# Patient Record
Sex: FEMALE | Race: WHITE | NOT HISPANIC OR LATINO | Employment: UNEMPLOYED | URBAN - METROPOLITAN AREA
[De-identification: names, ages, dates, MRNs, and addresses within clinical notes are randomized per-mention and may not be internally consistent; named-entity substitution may affect disease eponyms.]

---
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
## 2025-06-17 ENCOUNTER — TELEPHONE (OUTPATIENT)
Age: 20
End: 2025-06-17

## 2025-06-23 ENCOUNTER — TELEPHONE (OUTPATIENT)
Dept: BEHAVIORAL/MENTAL HEALTH CLINIC | Facility: CLINIC | Age: 20
End: 2025-06-23

## 2025-06-23 NOTE — TELEPHONE ENCOUNTER
Forms sent via [employer]. Pending [employer].    [facility] requesting that the forms be completed via [employer] if activated. Otherwise arrive at least 15 minutes early for appt to sign forms.

## 2025-07-15 ENCOUNTER — OFFICE VISIT (OUTPATIENT)
Dept: FAMILY MEDICINE CLINIC | Facility: CLINIC | Age: 20
End: 2025-07-15

## 2025-07-15 ENCOUNTER — OFFICE VISIT (OUTPATIENT)
Dept: PSYCHIATRY | Facility: CLINIC | Age: 20
End: 2025-07-15
Payer: COMMERCIAL

## 2025-07-15 VITALS
BODY MASS INDEX: 35.97 KG/M2 | DIASTOLIC BLOOD PRESSURE: 78 MMHG | OXYGEN SATURATION: 98 % | SYSTOLIC BLOOD PRESSURE: 118 MMHG | HEART RATE: 77 BPM | HEIGHT: 63 IN | WEIGHT: 203 LBS

## 2025-07-15 DIAGNOSIS — Z00.00 ANNUAL PHYSICAL EXAM: Primary | ICD-10-CM

## 2025-07-15 DIAGNOSIS — E55.9 VITAMIN D DEFICIENCY: ICD-10-CM

## 2025-07-15 DIAGNOSIS — Z13.1 SCREENING FOR DIABETES MELLITUS: ICD-10-CM

## 2025-07-15 DIAGNOSIS — N92.6 ABNORMAL MENSTRUAL CYCLE: ICD-10-CM

## 2025-07-15 DIAGNOSIS — Z13.0 SCREENING FOR DEFICIENCY ANEMIA: ICD-10-CM

## 2025-07-15 DIAGNOSIS — F41.8 DEPRESSION WITH ANXIETY: ICD-10-CM

## 2025-07-15 DIAGNOSIS — Z13.6 SCREENING FOR CARDIOVASCULAR CONDITION: ICD-10-CM

## 2025-07-15 DIAGNOSIS — Z11.59 NEED FOR HEPATITIS C SCREENING TEST: ICD-10-CM

## 2025-07-15 DIAGNOSIS — Z11.4 SCREENING FOR HIV (HUMAN IMMUNODEFICIENCY VIRUS): ICD-10-CM

## 2025-07-15 DIAGNOSIS — F41.1 GAD (GENERALIZED ANXIETY DISORDER): Primary | ICD-10-CM

## 2025-07-15 DIAGNOSIS — Z12.4 SCREENING FOR CERVICAL CANCER: ICD-10-CM

## 2025-07-15 DIAGNOSIS — Z13.29 SCREENING FOR THYROID DISORDER: ICD-10-CM

## 2025-07-15 PROBLEM — Z00.129 ENCOUNTER FOR WELL CHILD VISIT AT 17 YEARS OF AGE: Status: RESOLVED | Noted: 2022-11-09 | Resolved: 2025-07-15

## 2025-07-15 PROBLEM — Z71.82 EXERCISE COUNSELING: Status: RESOLVED | Noted: 2022-11-09 | Resolved: 2025-07-15

## 2025-07-15 PROBLEM — Z71.3 NUTRITIONAL COUNSELING: Status: RESOLVED | Noted: 2022-11-09 | Resolved: 2025-07-15

## 2025-07-15 PROCEDURE — 90792 PSYCH DIAG EVAL W/MED SRVCS: CPT

## 2025-07-15 RX ORDER — ESCITALOPRAM OXALATE 5 MG/1
5 TABLET ORAL DAILY
Qty: 30 TABLET | Refills: 1 | Status: SHIPPED | OUTPATIENT
Start: 2025-07-15

## 2025-07-15 NOTE — PROGRESS NOTES
Adult Annual Physical  Name: Kay Solorzano      : 2005      MRN: [id number]  Encounter Provider: JC Emanuel  Encounter Date: 7/15/2025   Encounter department: [facility]    :  Assessment & Plan  Annual physical exam  Routine labs ordered - will be completing following today's visit. Immunizations UTD. Referral to GYN placed to establish. F/u in 1 year or sooner as needed pending lab abnormalities.        Abnormal menstrual cycle  Reports periods are regular but occasionally heavier - feels it is associated with her mood and stress during the school year. LMP 06/15/2025. Should be getting menses any day now. Not currently sexually active, not on hormonal control.        Depression with anxiety  PHQ-9: 5 - will be establishing with psychologist today following this visit. Denies interest in medication management at this time. Denies HI/SI.          Vitamin D deficiency  Noted deficiency in 2018 - not currently taking supplements. Will repeat screening.   Orders:    Vitamin D 25 hydroxy; Future    Screening for cardiovascular condition  No prior known history, will screen to rule out.   Orders:    Lipid Panel with Direct LDL reflex; Future    Screening for thyroid disorder  No prior known history, will screen to rule out.   Orders:    TSH, 3rd generation with Free T4 reflex; Future    Screening for HIV (human immunodeficiency virus)  One-time preventative screening ordered.   Orders:    HIV 1/2 AG/AB w Reflex [facility] for 2 yr old and above; Future    Need for hepatitis C screening test  One-time preventative screening ordered.  Orders:    Hepatitis C Antibody; Future    Screening for deficiency anemia  Reports occasional heavy periods - declining iron panel. Will check CBC and determine if iron panel needed.   Orders:    CBC and differential; Future    Screening for diabetes mellitus  In setting of BMI >35, will screen for underlying diabetes.   Orders:    Comprehensive  metabolic panel; Future    Hemoglobin A1C; Future    Screening for cervical cancer  Referral to GYN placed to establish - not currently sexually active but has been in the past. Has not had a GYN exam.   Orders:    Ambulatory Referral to Obstetrics / Gynecology; Future                   Preventive Screenings:  - Diabetes Screening: orders placed  - Cholesterol Screening: orders placed   - Chlamydia Screening: patient declines   - Hepatitis C screening: orders placed   - HIV screening: orders placed   - Cervical cancer screening: orders placed   - Colon cancer screening: screening not indicated   - Lung cancer screening: screening not indicated     Counseling/Anticipatory Guidance:  - Alcohol: discussed moderation in alcohol intake and recommendations for healthy alcohol use.   - Drug use: discussed harms of illicit drug use and how it can negatively impact mental/physical health.   - Tobacco use: discussed harms of tobacco use and management options for quitting.   - Dental health: discussed importance of regular tooth brushing, flossing, and dental visits.   - Sexual health: discussed sexually transmitted diseases, partner selection, use of condoms, avoidance of unintended pregnancy, and contraceptive alternatives.   - Diet: discussed recommendations for a healthy/well-balanced diet.   - Exercise: the importance of regular exercise/physical activity was discussed. Recommend exercise 3-5 times per week for at least 30 minutes.   - Injury prevention: discussed safety/seat belts, safety helmets, smoke detectors, carbon monoxide detectors, and smoking near bedding or upholstery.       Depression Screening and Follow-up Plan: Patient was screened for depression during today's encounter. They screened negative with a PHQ-2 score of 2.          History of Present Illness     Adult Annual Physical:  Patient presents for annual physical. 20 year old female presents for annual physical exam/transfer of care from PCP who has  left the office. PMH significant for seasonal allergies and depression with anxiety. She is going to see a psychologist for their first session following today's visit. She manages her allergies with OTC products. Reports h/o childhood asthma but feels she has grown out of it. Does not take any daily medications. Currently studying communications/history at [employer] - lives on campus during school year, home for summer working at [employer]. Offers no major concerns at this time. .     Diet and Physical Activity:  - Diet/Nutrition: no special diet.  - Exercise: no formal exercise.    Depression Screening:  - PHQ-2 Score: 2  - PHQ-9 Score: 5    General Health:  - Sleep: 7-8 hours of sleep on average.  - Hearing: normal hearing right ear and normal hearing left ear.  - Vision: vision problems and most recent eye exam < 1 year ago.  - Dental: no dental visits for > 1 year. Will check into insurance regarding dentists covered    /GYN Health:  - Follows with GYN: no.   - History of STDs: no    Advanced Care Planning:  - Has an advanced directive?: no    - Has a durable medical POA?: no    - ACP document given to patient?: no      Review of Systems   Constitutional:  Negative for activity change, appetite change, chills, fatigue and fever.   HENT:  Negative for congestion, ear pain, postnasal drip, rhinorrhea and sore throat.    Eyes:  Negative for pain and visual disturbance.   Respiratory:  Negative for cough, chest tightness and shortness of breath.    Cardiovascular:  Negative for chest pain and palpitations.   Gastrointestinal:  Negative for abdominal pain, constipation, diarrhea, nausea and vomiting.   Genitourinary:  Negative for dysuria and hematuria.   Musculoskeletal:  Negative for arthralgias, back pain, myalgias and neck pain.   Skin:  Negative for color change and rash.   Allergic/Immunologic: Positive for environmental allergies. Negative for food allergies.   Neurological:  Negative  "for dizziness, seizures, syncope, light-headedness and headaches.   All other systems reviewed and are negative.        Objective   /78   Pulse 77   Ht 5' 3\" (1.6 m)   Wt 92.1 kg (203 lb)   SpO2 98%   BMI 35.96 kg/m²     Physical Exam  Vitals and nursing note reviewed.   Constitutional:       General: She is not in acute distress.     Appearance: Normal appearance. She is well-developed and normal weight.   HENT:      Head: Normocephalic and atraumatic.      Right Ear: Ear canal and external ear normal.      Left Ear: Tympanic membrane, ear canal and external ear normal.      Nose: No congestion or rhinorrhea.      Mouth/Throat:      Mouth: Mucous membranes are moist.     Eyes:      Extraocular Movements: Extraocular movements intact.      Conjunctiva/sclera: Conjunctivae normal.      Pupils: Pupils are equal, round, and reactive to light.       Cardiovascular:      Rate and Rhythm: Normal rate and regular rhythm.      Pulses: Normal pulses.      Heart sounds: Normal heart sounds. No murmur heard.  Pulmonary:      Effort: Pulmonary effort is normal. No respiratory distress.      Breath sounds: Normal breath sounds.   Abdominal:      General: Bowel sounds are normal.      Palpations: Abdomen is soft.      Tenderness: There is no abdominal tenderness. There is no right CVA tenderness, left CVA tenderness or guarding.     Musculoskeletal:         General: No swelling.      Cervical back: Neck supple.     Skin:     General: Skin is warm and dry.      Capillary Refill: Capillary refill takes less than 2 seconds.     Neurological:      General: No focal deficit present.      Mental Status: She is alert and oriented to person, place, and time.     Psychiatric:         Mood and Affect: Mood normal.         Behavior: Behavior normal.         Thought Content: Thought content normal.         Judgment: Judgment normal.         "
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
## 2025-08-12 ENCOUNTER — TELEMEDICINE (OUTPATIENT)
Dept: PSYCHIATRY | Facility: CLINIC | Age: 20
End: 2025-08-12
Payer: COMMERCIAL

## 2025-08-12 ENCOUNTER — TELEPHONE (OUTPATIENT)
Dept: PSYCHIATRY | Facility: CLINIC | Age: 20
End: 2025-08-12

## 2025-08-13 ENCOUNTER — TELEPHONE (OUTPATIENT)
Dept: PSYCHIATRY | Facility: CLINIC | Age: 20
End: 2025-08-13

## 2025-08-15 ENCOUNTER — TELEPHONE (OUTPATIENT)
Dept: PSYCHIATRY | Facility: CLINIC | Age: 20
End: 2025-08-15

## 2025-08-18 ENCOUNTER — TELEPHONE (OUTPATIENT)
Age: 20
End: 2025-08-18

## 2025-08-22 ENCOUNTER — TELEPHONE (OUTPATIENT)
Dept: PSYCHIATRY | Facility: CLINIC | Age: 20
End: 2025-08-22